# Patient Record
Sex: MALE | Race: WHITE
[De-identification: names, ages, dates, MRNs, and addresses within clinical notes are randomized per-mention and may not be internally consistent; named-entity substitution may affect disease eponyms.]

---

## 2022-01-13 ENCOUNTER — HOSPITAL ENCOUNTER (EMERGENCY)
Dept: HOSPITAL 94 - ER | Age: 32
Discharge: LEFT BEFORE DISCHARGE | End: 2022-01-13
Payer: SELF-PAY

## 2022-01-13 DIAGNOSIS — Z53.21: ICD-10-CM

## 2022-01-13 DIAGNOSIS — M79.602: Primary | ICD-10-CM

## 2022-01-14 ENCOUNTER — HOSPITAL ENCOUNTER (EMERGENCY)
Dept: HOSPITAL 94 - ER | Age: 32
Discharge: HOME | End: 2022-01-14
Payer: SELF-PAY

## 2022-01-14 VITALS — BODY MASS INDEX: 22.58 KG/M2 | WEIGHT: 132.28 LBS | HEIGHT: 64 IN

## 2022-01-14 VITALS — DIASTOLIC BLOOD PRESSURE: 80 MMHG | SYSTOLIC BLOOD PRESSURE: 126 MMHG
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
## 2022-01-14 DIAGNOSIS — F17.200: ICD-10-CM

## 2022-01-14 DIAGNOSIS — T23.122A: ICD-10-CM
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
## 2022-01-14 DIAGNOSIS — Z88.2: ICD-10-CM

## 2022-01-14 DIAGNOSIS — Z87.81: ICD-10-CM
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
## 2022-01-14 DIAGNOSIS — T23.162A: Primary | ICD-10-CM

## 2022-01-14 DIAGNOSIS — Z79.2: ICD-10-CM

## 2022-01-14 PROCEDURE — 99283 EMERGENCY DEPT VISIT LOW MDM: CPT

## 2022-01-14 PROCEDURE — 73090 X-RAY EXAM OF FOREARM: CPT

## 2022-01-14 PROCEDURE — 29105 APPLICATION LONG ARM SPLINT: CPT

## 2022-01-14 PROCEDURE — 16020 DRESS/DEBRID P-THICK BURN S: CPT

## 2022-01-14 NOTE — NUR
PATIENT IN NO DISTRESS. EVAL DONE BY VANITA NAVARRETE. ORTHO TECH IS PLACING SPLINT 
THAT IS REPLACING THE ONE THAT THE PATIENT DAMMAGED.